# Patient Record
Sex: MALE | URBAN - METROPOLITAN AREA
[De-identification: names, ages, dates, MRNs, and addresses within clinical notes are randomized per-mention and may not be internally consistent; named-entity substitution may affect disease eponyms.]

---

## 2021-07-10 ENCOUNTER — HOSPITAL ENCOUNTER (OUTPATIENT)
Dept: OCCUPATIONAL THERAPY | Facility: HOSPITAL | Age: 56
End: 2021-07-13
Attending: INTERNAL MEDICINE | Admitting: INTERNAL MEDICINE

## 2021-07-10 LAB
ABS NEUT (OLG): 18.14 X10(3)/MCL (ref 2.1–9.2)
ALBUMIN SERPL-MCNC: 3.5 GM/DL (ref 3.5–5)
ALBUMIN/GLOB SERPL: 1 RATIO (ref 1.1–2)
ALP SERPL-CCNC: 89 UNIT/L (ref 40–150)
ALT SERPL-CCNC: 56 UNIT/L (ref 0–55)
APPEARANCE, UA: CLEAR
APTT PPP: 26 SECOND(S) (ref 23.2–33.7)
AST SERPL-CCNC: 35 UNIT/L (ref 5–34)
BACTERIA SPEC CULT: ABNORMAL /HPF
BASOPHILS # BLD AUTO: 0.4 X10(3)/MCL (ref 0–0.2)
BASOPHILS NFR BLD AUTO: 2 %
BILIRUB SERPL-MCNC: 0.6 MG/DL
BILIRUB UR QL STRIP: NEGATIVE
BILIRUBIN DIRECT+TOT PNL SERPL-MCNC: 0.2 MG/DL (ref 0–0.5)
BILIRUBIN DIRECT+TOT PNL SERPL-MCNC: 0.4 MG/DL (ref 0–0.8)
BUN SERPL-MCNC: 14.5 MG/DL (ref 8.4–25.7)
CALCIUM SERPL-MCNC: 9.5 MG/DL (ref 8.4–10.2)
CHLORIDE SERPL-SCNC: 102 MMOL/L (ref 98–107)
CO2 SERPL-SCNC: 28 MMOL/L (ref 22–29)
COLOR UR: YELLOW
CREAT SERPL-MCNC: 1.12 MG/DL (ref 0.73–1.18)
EOSINOPHIL # BLD AUTO: 0.1 X10(3)/MCL (ref 0–0.9)
EOSINOPHIL NFR BLD AUTO: 1 %
ERYTHROCYTE [DISTWIDTH] IN BLOOD BY AUTOMATED COUNT: 15 % (ref 11.5–17)
GLOBULIN SER-MCNC: 3.5 GM/DL (ref 2.4–3.5)
GLUCOSE (UA): NEGATIVE
GLUCOSE SERPL-MCNC: 97 MG/DL (ref 74–100)
HCT VFR BLD AUTO: 50.4 % (ref 42–52)
HGB BLD-MCNC: 16.3 GM/DL (ref 14–18)
HGB UR QL STRIP: ABNORMAL
INR PPP: 1.1 (ref 0–1.3)
KETONES UR QL STRIP: NEGATIVE
LACTATE SERPL-SCNC: 2.3 MMOL/L (ref 0.5–2.2)
LEUKOCYTE ESTERASE UR QL STRIP: ABNORMAL
LYMPHOCYTES # BLD AUTO: 2.7 X10(3)/MCL (ref 0.6–4.6)
LYMPHOCYTES NFR BLD AUTO: 11 %
MCH RBC QN AUTO: 29.9 PG (ref 27–31)
MCHC RBC AUTO-ENTMCNC: 32.3 GM/DL (ref 33–36)
MCV RBC AUTO: 92.3 FL (ref 80–94)
MONOCYTES # BLD AUTO: 1.9 X10(3)/MCL (ref 0.1–1.3)
MONOCYTES NFR BLD AUTO: 8 %
NEUTROPHILS # BLD AUTO: 18.14 X10(3)/MCL (ref 2.1–9.2)
NEUTROPHILS NFR BLD AUTO: 77 %
NITRITE UR QL STRIP: NEGATIVE
PH UR STRIP: 8 [PH] (ref 5–9)
PLATELET # BLD AUTO: 162 X10(3)/MCL (ref 130–400)
PMV BLD AUTO: 13.3 FL (ref 9.4–12.4)
POTASSIUM SERPL-SCNC: 4.4 MMOL/L (ref 3.5–5.1)
PROT SERPL-MCNC: 7 GM/DL (ref 6.4–8.3)
PROT UR QL STRIP: ABNORMAL
PROTHROMBIN TIME: 14 SECOND(S) (ref 12.5–14.5)
RBC # BLD AUTO: 5.46 X10(6)/MCL (ref 4.7–6.1)
RBC #/AREA URNS HPF: >200 /HPF
SARS-COV-2 AG RESP QL IA.RAPID: NEGATIVE
SODIUM SERPL-SCNC: 141 MMOL/L (ref 136–145)
SP GR UR STRIP: 1.02 (ref 1–1.03)
SQUAMOUS EPITHELIAL, UA: ABNORMAL /HPF
UA WBC MAN: ABNORMAL /HPF
UROBILINOGEN UR STRIP-ACNC: 1
WBC # SPEC AUTO: 23.4 X10(3)/MCL (ref 4.5–11.5)

## 2021-07-11 LAB
ABS NEUT (OLG): 14.66 X10(3)/MCL (ref 2.1–9.2)
ALBUMIN SERPL-MCNC: 2.8 GM/DL (ref 3.5–5)
ALBUMIN/GLOB SERPL: 0.9 RATIO (ref 1.1–2)
ALP SERPL-CCNC: 77 UNIT/L (ref 40–150)
ALT SERPL-CCNC: 42 UNIT/L (ref 0–55)
AST SERPL-CCNC: 25 UNIT/L (ref 5–34)
BASOPHILS # BLD AUTO: 0.2 X10(3)/MCL (ref 0–0.2)
BASOPHILS NFR BLD AUTO: 1 %
BILIRUB SERPL-MCNC: 0.7 MG/DL
BILIRUBIN DIRECT+TOT PNL SERPL-MCNC: 0.3 MG/DL (ref 0–0.5)
BILIRUBIN DIRECT+TOT PNL SERPL-MCNC: 0.4 MG/DL (ref 0–0.8)
BUN SERPL-MCNC: 15.3 MG/DL (ref 8.4–25.7)
CALCIUM SERPL-MCNC: 8.6 MG/DL (ref 8.4–10.2)
CHLORIDE SERPL-SCNC: 104 MMOL/L (ref 98–107)
CO2 SERPL-SCNC: 26 MMOL/L (ref 22–29)
CREAT SERPL-MCNC: 0.88 MG/DL (ref 0.73–1.18)
EOSINOPHIL # BLD AUTO: 0.1 X10(3)/MCL (ref 0–0.9)
EOSINOPHIL NFR BLD AUTO: 0 %
ERYTHROCYTE [DISTWIDTH] IN BLOOD BY AUTOMATED COUNT: 15 % (ref 11.5–17)
GLOBULIN SER-MCNC: 3.1 GM/DL (ref 2.4–3.5)
GLUCOSE SERPL-MCNC: 141 MG/DL (ref 74–100)
HCT VFR BLD AUTO: 46 % (ref 42–52)
HGB BLD-MCNC: 14.6 GM/DL (ref 14–18)
LACTATE SERPL-SCNC: 1.4 MMOL/L (ref 0.5–2.2)
LYMPHOCYTES # BLD AUTO: 2 X10(3)/MCL (ref 0.6–4.6)
LYMPHOCYTES NFR BLD AUTO: 11 %
MCH RBC QN AUTO: 29.5 PG (ref 27–31)
MCHC RBC AUTO-ENTMCNC: 31.7 GM/DL (ref 33–36)
MCV RBC AUTO: 92.9 FL (ref 80–94)
MONOCYTES # BLD AUTO: 1.6 X10(3)/MCL (ref 0.1–1.3)
MONOCYTES NFR BLD AUTO: 9 %
NEUTROPHILS # BLD AUTO: 14.66 X10(3)/MCL (ref 2.1–9.2)
NEUTROPHILS NFR BLD AUTO: 78 %
PLATELET # BLD AUTO: 136 X10(3)/MCL (ref 130–400)
PMV BLD AUTO: 13.3 FL (ref 9.4–12.4)
POTASSIUM SERPL-SCNC: 3.6 MMOL/L (ref 3.5–5.1)
PROT SERPL-MCNC: 5.9 GM/DL (ref 6.4–8.3)
RBC # BLD AUTO: 4.95 X10(6)/MCL (ref 4.7–6.1)
SODIUM SERPL-SCNC: 138 MMOL/L (ref 136–145)
WBC # SPEC AUTO: 18.7 X10(3)/MCL (ref 4.5–11.5)

## 2021-07-13 LAB
ABS NEUT (OLG): 7.9 X10(3)/MCL (ref 2.1–9.2)
BASOPHILS # BLD AUTO: 0.3 X10(3)/MCL (ref 0–0.2)
BASOPHILS NFR BLD AUTO: 3 %
EOSINOPHIL # BLD AUTO: 0.3 X10(3)/MCL (ref 0–0.9)
EOSINOPHIL NFR BLD AUTO: 3 %
ERYTHROCYTE [DISTWIDTH] IN BLOOD BY AUTOMATED COUNT: 14.6 % (ref 11.5–17)
HCT VFR BLD AUTO: 46.1 % (ref 42–52)
HGB BLD-MCNC: 15.1 GM/DL (ref 14–18)
LYMPHOCYTES # BLD AUTO: 2.1 X10(3)/MCL (ref 0.6–4.6)
LYMPHOCYTES NFR BLD AUTO: 18 %
MCH RBC QN AUTO: 30 PG (ref 27–31)
MCHC RBC AUTO-ENTMCNC: 32.8 GM/DL (ref 33–36)
MCV RBC AUTO: 91.5 FL (ref 80–94)
MONOCYTES # BLD AUTO: 1 X10(3)/MCL (ref 0.1–1.3)
MONOCYTES NFR BLD AUTO: 8 %
NEUTROPHILS # BLD AUTO: 7.9 X10(3)/MCL (ref 2.1–9.2)
NEUTROPHILS NFR BLD AUTO: 67 %
PLATELET # BLD AUTO: 121 X10(3)/MCL (ref 130–400)
PMV BLD AUTO: 12.7 FL (ref 9.4–12.4)
RBC # BLD AUTO: 5.04 X10(6)/MCL (ref 4.7–6.1)
WBC # SPEC AUTO: 11.8 X10(3)/MCL (ref 4.5–11.5)

## 2021-07-15 LAB
FINAL CULTURE: NORMAL
FINAL CULTURE: NORMAL

## 2022-04-30 NOTE — ED PROVIDER NOTES
Patient:   Ryan Bettencourt             MRN: [id number]            FIN: [id number]               Age:   55 years     Sex:  Male     :  1965   Associated Diagnoses:   UTI (urinary tract infection); Leukocytosis; Right flank pain; LN (lymphadenopathy)   Author:   Juan Rios PA-C      Basic Information   Time seen: Date & time 7/10/2021 17:00:00.   History source: Patient.   Arrival mode: Private vehicle, walking.   History limitation: None.   Additional information: Chief Complaint from Nursing Triage Note : Chief Complaint   7/10/2021 16:57 CDT      Chief Complaint           pt c/o frequent urination that started after waking up this AM, hematuria just prior to leving his house.  .      History of Present Illness   The patient presents with hematuria, SORT:  Male with recent back pain states placed on steroid now having urinary frequency and hematuria.  PIOTR Souza, Juan Rios PA-C have assumed care of the patient at 6:15 PM.  55-year-old male since to ED for right back/flank pain and urinary frequency/hematuria.  Patient reports he was seen by his PCP for back pain and placed on steroids.  Patient reports today he had urinary frequency this AM and hematuria that began just prior to arrival. Denies fever, vomiting ,diarrhea, chest pain, shortness of breath. Reports hx of previous kidney stones. Urologist Dr. Islas in [place] .  The onset was today .  The course/duration of symptoms is fluctuating in intensity.  Character of hematuria red grossly bloody.  Associated symptoms: frequency, flank pain, denies fever, denies chills, denies nausea and denies vomiting.        Review of Systems   Constitutional symptoms:  No fever, no chills.    Skin symptoms:  No rash,    Eye symptoms:  Negative except as documented in HPI.   ENMT symptoms:  Negative except as documented in HPI.   Respiratory symptoms:  No shortness of breath,    Cardiovascular symptoms:  No chest pain,    Gastrointestinal symptoms:   Abdominal pain, right flank, no nausea, no vomiting, no diarrhea.    Genitourinary symptoms:  Hematuria, Urinary frequency.    Musculoskeletal symptoms:  Back pain, Muscle pain.    Neurologic symptoms:  Negative except as documented in HPI.   Psychiatric symptoms:  Negative except as documented in HPI.   Endocrine symptoms:  Negative except as documented in HPI.   Hematologic/Lymphatic symptoms:  Negative except as documented in HPI.   Allergy/immunologic symptoms:  Negative except as documented in HPI.             Additional review of systems information: All other systems reviewed and otherwise negative.      Health Status   Allergies:    Allergic Reactions (Selected)  Severity Not Documented  Lisinopril- Angioedema.  Penicillins- Unknown..   Medications:  (Selected)   Documented Medications  Documented  EPINEPHrine 0.3 mg injectable kit: 0.3 mg, IM, As Directed  amLODIPine 10 mg oral tablet: 10 mg = 1 tab(s), Oral, Daily  famotidine 40 mg oral tablet: 40 mg = 1 tab(s), Oral, Daily  prednisONE 20 mg oral tablet: 20 mg = 1 tab(s), Oral, Daily.      Past Medical/ Family/ Social History   Medical history:    No active or resolved past medical history items have been selected or recorded..   Surgical history:    No active procedure history items have been selected or recorded..   Family history:    No family history items have been selected or recorded..   Problem list:    No qualifying data available  .      Physical Examination               Vital Signs   Vital Signs   7/10/2021 16:57 CDT      Temperature Oral          37.5 DegC                             Temperature Oral (calculated)             99.50 DegF                             Peripheral Pulse Rate     83 bpm                             Respiratory Rate          18 br/min                             SpO2                      98 %                             Oxygen Therapy            Room air                             Systolic Blood Pressure   143 mmHg   HI                             Diastolic Blood Pressure  82 mmHg  .   General:  Alert, no acute distress.    Skin:  Warm, dry.    Ears, nose, mouth and throat:  Oral mucosa moist.   Neck:  Trachea midline.   Cardiovascular:  Regular rate and rhythm, No murmur, Normal peripheral perfusion, No edema.    Respiratory:  Lungs are clear to auscultation, respirations are non-labored, breath sounds are equal, Symmetrical chest wall expansion.    Gastrointestinal:  Soft, Non distended, Normal bowel sounds, Tenderness: Mild, right flank.    Back:  Normal range of motion, Normal alignment, no step-offs, Lumbar: Right, lateral, mild, tenderness, no swelling, no ecchymosis, no laceration, no abrasion, no step-off.    Musculoskeletal:  Normal ROM, normal strength.    Neurological:  Alert and oriented to person, place, time, and situation, No focal neurological deficit observed, CN II-XII intact.    Psychiatric:  Cooperative.      Medical Decision Making   Differential Diagnosis:  Hematuria, renal stone, ureteral stone.    Documents reviewed:  Emergency department nurses' notes.   Orders  Launch Orders   Laboratory:  Urinalysis with Reflex (Order): Stat collect, Urine, 7/10/2021 17:01 CDT, Nurse collect, Print Label By Order Location  INR - Protime (Order): Stat collect, 7/10/2021 17:01 CDT, Blood, Lab Collect, Print Label By Order Location, 7/10/2021 17:01 CDT  PTT (Order): Stat collect, 7/10/2021 17:01 CDT, Blood, Lab Collect, Print Label By Order Location, 7/10/2021 17:01 CDT  CMP (Order): Stat collect, 7/10/2021 17:01 CDT, Blood, Lab Collect, Print Label By Order Location, 7/10/2021 17:01 CDT  CBC w/ Auto Diff (Order): Now collect, 7/10/2021 17:01 CDT, Blood, Lab Collect, Print Label By Order Location, 7/10/2021 17:01 CDT  Patient Care:  Saline Lock Insert (Order): 7/10/2021 17:01 CDT, Launch Orders   Patient Care:  Bladder Scan (Order): 7/10/2021 18:15 CDT, 7/10/2021 18:15 CDT  , Launch Orders   Pharmacy:  morphine 4 mg/mL  preservative-free intravenous solution (Order): 4 mg, form: Injection, IV Push, Once, first dose 7/10/2021 18:54 CDT, stop date 7/10/2021 18:54 CDT, STAT, ( > 7 on pain scale)  Radiology:  CT Abdomen and Pelvis W Contrast (Order): Stat, 7/10/2021 18:54 CDT, Abdominal Pain, RLQ pain; right flank pain, hematuria, None, Stretcher, Creatinine if needed per protocol, Rad Type  , Launch Orders   Pharmacy:  Zofran 2 mg/mL injectable solution (Order): 4 mg, form: Injection, IV Push, Once, first dose 7/10/2021 18:55 CDT, stop date 7/10/2021 18:55 CDT, STAT  , Launch Orders   Laboratory:  Lactic Acid (Order): Stat collect, 7/10/2021 19:31 CDT, Blood, Lab Collect, Print Label By Order Location, 7/10/2021 19:31 CDT  Blood Culture (Order): 7/10/2021 19:31 CDT, Stat collect, Blood  Blood Culture (Order): 7/10/2021 19:31 CDT, Stat collect, Blood  Pharmacy:  Normal Saline (0.9% NS) IV 1,000 mL (Order): 1,000 mL, 1,000 mL, IV, Once, 1,000 mL/hr, start date 7/10/2021 19:31 CDT  , Launch Orders   Pharmacy:  Cipro (Order): 400 mg, form: Infusion Complicated UTI, IV Piggyback, [id number], first dose 7/10/2021 21:55 CDT  , Launch Orders   Admit/Transfer/Discharge:  Admit as Inpatient (Order): 7/10/2021 21:02 CDT, Medical Unit Coy BOYD, Pelon ROBERTO, UTI (urinary tract infection)  Leukocytosis  Right flank pain  LN (lymphadenopathy), No  .    Results review:  Lab results : Lab View   7/10/2021 17:32 CDT      Sodium Lvl                141 mmol/L                             Potassium Lvl             4.4 mmol/L                             Chloride                  102 mmol/L                             CO2                       28 mmol/L                             Calcium Lvl               9.5 mg/dL                             Glucose Lvl               97 mg/dL                             BUN                       14.5 mg/dL                             Creatinine                1.12 mg/dL                             eGFR-AA                    >60  NA                             eGFR-TESSA                  >60 mL/min/1.73 m2  NA                             Bili Total                0.6 mg/dL                             Bili Direct               0.2 mg/dL                             Bili Indirect             0.40 mg/dL                             AST                       35 unit/L  HI                             ALT                       56 unit/L  HI                             Alk Phos                  89 unit/L                             Total Protein             7.0 gm/dL                             Albumin Lvl               3.5 gm/dL                             Globulin                  3.5 gm/dL                             A/G Ratio                 1.0 ratio  LOW                             PT                        14.0 second(s)                             INR                       1.1                             PTT                       26.0 second(s)                             WBC                       23.4 x10(3)/mcL  HI                             RBC                       5.46 x10(6)/mcL                             Hgb                       16.3 gm/dL                             Hct                       50.4 %                             Platelet                  162 x10(3)/mcL                             MCV                       92.3 fL                             MCH                       29.9 pg                             MCHC                      32.3 gm/dL  LOW                             RDW                       15.0 %                             MPV                       13.3 fL  HI                             Abs Neut                  18.14 x10(3)/mcL  HI                             Neutro Auto               77 %  NA                             Lymph Auto                11 %  NA                             Mono Auto                 8 %  NA                             Eos Auto                  1 %  NA                              Abs Eos                   0.1 x10(3)/mcL                             Basophil Auto             2 %  NA                             Abs Neutro                18.14 x10(3)/mcL  HI                             Abs Lymph                 2.7 x10(3)/mcL                             Abs Mono                  1.9 x10(3)/mcL  HI                             Abs Baso                  0.4 x10(3)/mcL  HI    7/10/2021 17:05 CDT      UA Appear                 CLEAR                             UA Color                  YELLOW                             UA Spec Grav              1.019                             UA Bili                   Negative                             UA pH                     8.0                             UA Urobilinogen           1.0                             UA Blood                  3+                             UA Glucose                Negative                             UA Ketones                Negative                             UA Protein                3+                             UA Nitrite                Negative                             UA Leuk Est               1+                             UA WBC Man                20-30 /HPF                             UA RBC                    >200 /HPF                             UA Bacteria               Few /HPF                             UA Squam Epithelial       0-4 /HPF    .   Radiology results:  Rad Results (ST)  < 12 hrs   Accession: [id number]  Order: CT Abdomen and Pelvis W Contrast  Report Dt/Tm: 07/10/2021 20:22  Report:   Clinical History:  Abdominal Pain     Technique:  Multidetector IV contrast enhanced axial CT images of the abdomen and  pelvis were obtained with coronal and sagittal reconstructions.      Automatic exposure control was utilized to reduce the patient's  radiation dose.     Comparison:  No prior imaging available for comparison.     Findings:     01. HEPATOBILIARY: No focal hepatic lesion is identified,  gallstones  are noted within the gallbladder.     02. SPLEEN: Normal     03. PANCREAS: No focal masses or ductal dilatation.     04. ADRENALS: No adrenal nodules.     05. KIDNEYS: The right kidney demonstrates no stone, hydronephrosis,  or hydroureter. No focal mass identified.The left kidney demonstrates  no stone, hydronephrosis, or hydroureter. No focal mass identified.     06. LYMPHADENOPATHY/RETROPERITONEUM: 12 mm lymph node to the right of  the right common iliac artery is possibly reactive. Recommend  continued follow-up to exclude malignancy. The abdominal aorta is  normal in course and caliber.      07. BOWEL: No acute bowel related abnormalities. No evidence of  appendiceal inflammation.     08. PELVIC VISCERA: Marked diffuse thickening of the bladder wall.  Cystitis is suspected.     09. PELVIC LYMPH NODES: No lymphadenopathy.     10. PERITONEUM/ABDOMINAL WALL: No ascites or implant.     11. SKELETAL: No aggressive appearing lytic/blastic lesion. No acute  fractures, subluxations or dislocations.     12. LUNG BASES: The visualized lungs are unremarkable.     Impression  Marked diffuse thickening of the bladder wall. Cystitis is suspected.  Recommend follow to resolution to exclude malignancy.     12 mm lymph node to the right of the right common iliac artery is  possibly reactive. Recommend continued follow-up to exclude  malignancy.        .   Notes:  Post void bladder scan 0.      Impression and Plan   Diagnosis   UTI (urinary tract infection) (KNY55-NH N39.0)   Leukocytosis (QKX89-EY D72.829)   Right flank pain (ZEH21-IB R10.9)   LN (lymphadenopathy) (LRT26-DW R59.1)      Calls-Consults   -  - Spoke with Dr. Chi- recommends admission for IV abx  - Spoke with GEOVANY Garcia with hospitalist- accepts admission .   Plan   Condition: Stable.    Disposition: Admit time  7/10/2021 21:02:00, Coy BOYD, Pelon CROWLEY       Addendum      Teaching-Supervisory Addendum-Brief   I participated in the following  activities of this patients care: the medical history, the physical exam, medical decision making.   I personally performed: supervision of the patient's care, the medical history, the physical exam, the medical decision making.   The case was discussed with: the physician assistant.   Evaluation and management service: I agree with the evaluation and management decisions made in this patient's care.   Results interpretation: I agree with the study interpretation in this patient's care.

## 2022-04-30 NOTE — ED PROVIDER NOTES
Patient:   Ryan Bettencourt             MRN: [id number]            FIN: [id number]               Age:   55 years     Sex:  Male     :  1965   Associated Diagnoses:   None   Author:   Alon BOYD, Joan CABELLO      Addendum      Teaching-Supervisory Addendum-Brief   I participated in the following activities of this patients care: the medical history, the physical exam, medical decision making.   I personally performed: supervision of the patient's care, the medical history, the physical exam, the medical decision making.   The case was discussed with: the physician assistant.   Evaluation and management service: I agree with the evaluation and management decisions made in this patient's care.   Results interpretation: I agree with the study interpretation in this patient's care.   I had face to face tiem with the patient. i performed a history and physical exam and discussed plan of care with Enrrique Gates  54 yo m with right low back pain and fever noted to have significant cystitis, admit for iv abx  well developed, well nourished  normocephalic, atraumatic  regular rate, lungs clear  right low back tenderness to palpation  no rashes, no cva tenderness